# Patient Record
Sex: MALE | Race: AMERICAN INDIAN OR ALASKA NATIVE
[De-identification: names, ages, dates, MRNs, and addresses within clinical notes are randomized per-mention and may not be internally consistent; named-entity substitution may affect disease eponyms.]

---

## 2018-12-26 ENCOUNTER — HOSPITAL ENCOUNTER (EMERGENCY)
Dept: HOSPITAL 31 - C.ER | Age: 58
Discharge: HOME | End: 2018-12-26
Payer: SELF-PAY

## 2018-12-26 VITALS
OXYGEN SATURATION: 99 % | SYSTOLIC BLOOD PRESSURE: 151 MMHG | DIASTOLIC BLOOD PRESSURE: 94 MMHG | HEART RATE: 61 BPM | TEMPERATURE: 97.1 F

## 2018-12-26 VITALS — RESPIRATION RATE: 18 BRPM

## 2018-12-26 DIAGNOSIS — I10: Primary | ICD-10-CM

## 2018-12-26 LAB
ALBUMIN SERPL-MCNC: 4.4 G/DL (ref 3.5–5)
ALBUMIN/GLOB SERPL: 1.4 {RATIO} (ref 1–2.1)
ALT SERPL-CCNC: 20 U/L (ref 21–72)
AST SERPL-CCNC: 23 U/L (ref 17–59)
BASOPHILS # BLD AUTO: 0.1 K/UL (ref 0–0.2)
BASOPHILS NFR BLD: 0.8 % (ref 0–2)
BNP SERPL-MCNC: 90.3 PG/ML (ref 0–900)
BUN SERPL-MCNC: 17 MG/DL (ref 9–20)
CALCIUM SERPL-MCNC: 8.9 MG/DL (ref 8.6–10.4)
EOSINOPHIL # BLD AUTO: 0.5 K/UL (ref 0–0.7)
EOSINOPHIL NFR BLD: 8.6 % (ref 0–4)
ERYTHROCYTE [DISTWIDTH] IN BLOOD BY AUTOMATED COUNT: 13 % (ref 11.5–14.5)
GFR NON-AFRICAN AMERICAN: > 60
HGB BLD-MCNC: 15.6 G/DL (ref 12–18)
LYMPHOCYTES # BLD AUTO: 3.1 K/UL (ref 1–4.3)
LYMPHOCYTES NFR BLD AUTO: 51.4 % (ref 20–40)
MCH RBC QN AUTO: 32.9 PG (ref 27–31)
MCHC RBC AUTO-ENTMCNC: 34.4 G/DL (ref 33–37)
MCV RBC AUTO: 95.7 FL (ref 80–94)
MONOCYTES # BLD: 0.4 K/UL (ref 0–0.8)
MONOCYTES NFR BLD: 7.5 % (ref 0–10)
NEUTROPHILS # BLD: 1.9 K/UL (ref 1.8–7)
NEUTROPHILS NFR BLD AUTO: 31.7 % (ref 50–75)
NRBC BLD AUTO-RTO: 0.2 % (ref 0–2)
PLATELET # BLD: 224 K/UL (ref 130–400)
PMV BLD AUTO: 8.5 FL (ref 7.2–11.7)
RBC # BLD AUTO: 4.73 MIL/UL (ref 4.4–5.9)
WBC # BLD AUTO: 5.9 K/UL (ref 4.8–10.8)

## 2018-12-26 NOTE — CT
Date of service: 12/26/2018



PROCEDURE:  CT HEAD WITHOUT CONTRAST.



HISTORY:

dizziness



COMPARISON:

None available.



TECHNIQUE:

Axial computed tomography images were obtained through the head/brain 

without intravenous contrast.  



Radiation dose:



Total exam DLP = 1110.31 mGy-cm.



This CT exam was performed using one or more of the following dose 

reduction techniques: Automated exposure control, adjustment of the 

mA and/or kV according to patient size, and/or use of iterative 

reconstruction technique.



FINDINGS:



HEMORRHAGE:

No intracranial hemorrhage. 



BRAIN:

No mass effect or edema.  Small hypodense focus in the right frontal 

lobe may reflect encephalomalacia versus region of more prominent 

atrophy.  Gray-white matter differentiation appears otherwise intact. 

 Please note that MRI with diffusion imaging is more sensitive in the 

detection of acute ischemic event.



VENTRICLES:

Cavum septum pellucidum, anatomic variant.  No hydrocephalus. 



CALVARIUM:

Unremarkable.



PARANASAL SINUSES:

Unremarkable as visualized. No significant inflammatory changes.



MASTOID AIR CELLS:

Unremarkable as visualized. No inflammatory changes.



OTHER FINDINGS:

Chronic appearing right lamina papyracea fracture.



IMPRESSION:

No acute intracranial pathology identified.  



Small hypodense focus in the right frontal lobe may reflect 

encephalomalacia versus region of more prominent atrophy. 



Chronic appearing right lamina papyracea fracture.

## 2018-12-26 NOTE — RAD
Date of service: 



12/26/2018



PROCEDURE:  CHEST RADIOGRAPH, 1 VIEW



HISTORY:

SOB



COMPARISON:

None available.



FINDINGS:



LUNGS:

Clear.



PLEURA:

No pneumothorax or pleural fluid seen.



CARDIOVASCULAR:

 No aortic atherosclerotic calcification present. Normal.



OSSEOUS STRUCTURES:

No significant abnormalities.



VISUALIZED UPPER ABDOMEN:

Normal.



OTHER FINDINGS:

None. 



IMPRESSION:

No active disease.

## 2018-12-26 NOTE — C.PDOC
History Of Present Illness


58 year old male with a history of HTN is brought into the emergency department 

via ambulance with reports of lightheadedness and dizziness due to running out 

of his blood pressure medication (Norvasc). Patient states that he ran out of 

the medication five days ago. He has no other complaints at this time. 


Time Seen by Provider: 12/26/18 11:49


Chief Complaint (Nursing): Dizziness/Lightheaded


History Per: Patient


History/Exam Limitations: no limitations


Onset/Duration Of Symptoms: Hrs


Current Symptoms Are (Timing): Still Present


Seizure Or Post-ictal Symptoms: None





Past Medical History


Reviewed: Historical Data, Nursing Documentation, Vital Signs


Vital Signs: 





                                Last Vital Signs











Temp  97.5 F L  12/26/18 11:17


 


Pulse  65   12/26/18 11:17


 


Resp  18   12/26/18 11:17


 


BP  174/101 H  12/26/18 11:17


 


Pulse Ox  97   12/26/18 11:17














- Medical History


PMH: HTN


Surgical History: No Surg Hx


Family History: States: No Known Family Hx





- Social History


Hx Alcohol Use: No


Hx Substance Use: No (former as per patient)





Review Of Systems


Except As Marked, All Systems Reviewed And Found Negative.


Cardiovascular: Positive for: Light Headedness


Neurological: Positive for: Dizziness





Physical Exam





- Physical Exam


Appears: Non-toxic, No Acute Distress


Skin: Warm, Dry


Head: Atraumatic, Normacephalic


Eye(s): bilateral: Normal Inspection, PERRL, EOMI


Nose: Normal


Oral Mucosa: Moist


Neck: Normal, Supple


Cardiovascular: Rhythm Regular, No Murmur


Respiratory: Normal Breath Sounds, No Rales, No Rhonchi, No Wheezing


Gastrointestinal/Abdominal: Soft, No Tenderness, No Guarding, No Rebound


Extremity: Bilateral: Atraumatic, Normal Color And Temperature, Normal ROM


Neurological/Psych: Oriented x3, Normal Speech





ED Course And Treatment





- Laboratory Results


Result Diagrams: 


                                 12/26/18 12:21





                                 12/26/18 12:21


O2 Sat by Pulse Oximetry: 97 (RA)


Pulse Ox Interpretation: Normal





- Radiology


CXR: Viewed By Me, Read By Radiologist


CXR Interpretation: Yes: No Acute Disease





- CT Scan/US


  ** CT Head


Other Rad Studies (CT/US): Read By Radiologist, Radiology Report Reviewed


CT/US Interpretation: IMPRESSION:  No acute intracranial pathology identified.  

Small hypodense focus in the right frontal lobe may reflect encephalomalacia 

versus region of more prominent atrophy.  Chronic appearing right lamina 

papyracea fracture.





Medical Decision Making


Medical Decision Making: 


Plan:


CT Head


EKG


Chemistry


Hematology


CXR


Norvasc 5mg PO


Zestril 5mg PO





Assessment: Dizziness due to history of HTN.





Disposition


Counseled Patient/Family Regarding: Studies Performed, Diagnosis, Need For 

Followup, Rx Given





- Disposition


Disposition: HOME/ ROUTINE


Disposition Time: 13:58


Condition: STABLE


Additional Instructions: 


follow up with medical clinic within 2 days


call to make an appointment


take medication as prescribed


return to ER if symptoms worsens or progress 


Prescriptions: 


amLODIPine [Norvasc] 5 mg PO DAILY #20 tab


Lisinopril [Prinivil] 10 mg PO DAILY #20 tablet


Instructions:  Dizziness, Nonvertigo, (DC), Medicines for High Blood Pressure, 

High Blood Pressure in Adults


Forms:  [facility] (English), General Discharge Instructions, Work Excuse





- Clinical Impression


Clinical Impression: 


 Dizziness, Hypertension








- Scribe Statement


The provider has reviewed the documentation as recorded by the Chuckieibe


Provider Attestation: 


All medical record entries made by the Scribe were at my direction and 

personally dictated by me. I have reviewed the chart and agree that the record 

accurately reflects my personal performance of the history, physical exam, 

medical decision making, and the department course for this patient. I have also

personally directed, reviewed, and agree with the discharge instructions and 

disposition.

## 2018-12-27 NOTE — CARD
--------------- APPROVED REPORT --------------





Date of service: 12/26/2018



EKG Measurement

Heart [id number]

[id number]

[id number]

[id number]

[id number]



<Conclusion>

Sinus bradycardia

Otherwise normal ECG